# Patient Record
Sex: FEMALE | Race: WHITE
[De-identification: names, ages, dates, MRNs, and addresses within clinical notes are randomized per-mention and may not be internally consistent; named-entity substitution may affect disease eponyms.]

---

## 2019-06-12 ENCOUNTER — HOSPITAL ENCOUNTER (OUTPATIENT)
Dept: HOSPITAL 47 - RADCTMAIN | Age: 84
Discharge: HOME | End: 2019-06-12
Attending: PSYCHIATRY & NEUROLOGY
Payer: MEDICARE

## 2019-06-12 DIAGNOSIS — G31.1: Primary | ICD-10-CM

## 2019-06-12 DIAGNOSIS — I67.82: ICD-10-CM

## 2019-06-12 PROCEDURE — 70450 CT HEAD/BRAIN W/O DYE: CPT

## 2019-06-13 NOTE — CT
EXAMINATION TYPE: CT brain wo con

 

DATE OF EXAM: 6/12/2019

 

HISTORY: Concussion without loss of consciousness

 

CT DLP: 1029 mGycm.  Automated Exposure Control for Dose Reduction was Utilized.

 

TECHNIQUE: CT scan of the head is performed without contrast.

 

COMPARISON: Prior outside CT head April 27, 2019.

 

FINDINGS:   There is no acute intracranial hemorrhage or midline shift identified. There is diffuse v
entricular and sulcal prominence consistent with diffuse age-related cerebral atrophy.  There is low-
attenuation in the periventricular white matter consistent with chronic small vessel ischemic change.
  The globes are intact and the visualized sinuses are clear.   

 

IMPRESSION:  No acute intracranial hemorrhage or midline shift.  There is mild diffuse age-related ce
rebral atrophy and chronic small vessel ischemic change redemonstrated.  No significant change from p
rior study.